# Patient Record
Sex: MALE
[De-identification: names, ages, dates, MRNs, and addresses within clinical notes are randomized per-mention and may not be internally consistent; named-entity substitution may affect disease eponyms.]

---

## 2021-08-19 PROBLEM — Z00.00 ENCOUNTER FOR PREVENTIVE HEALTH EXAMINATION: Status: ACTIVE | Noted: 2021-08-19

## 2021-10-26 ENCOUNTER — APPOINTMENT (OUTPATIENT)
Dept: NEUROLOGY | Facility: CLINIC | Age: 76
End: 2021-10-26

## 2021-12-13 ENCOUNTER — APPOINTMENT (OUTPATIENT)
Dept: NEUROLOGY | Facility: CLINIC | Age: 76
End: 2021-12-13
Payer: MEDICARE

## 2021-12-13 VITALS
WEIGHT: 215 LBS | SYSTOLIC BLOOD PRESSURE: 147 MMHG | BODY MASS INDEX: 28.49 KG/M2 | HEIGHT: 73 IN | TEMPERATURE: 98.3 F | HEART RATE: 68 BPM | DIASTOLIC BLOOD PRESSURE: 80 MMHG | OXYGEN SATURATION: 100 %

## 2021-12-13 DIAGNOSIS — G62.9 POLYNEUROPATHY, UNSPECIFIED: ICD-10-CM

## 2021-12-13 DIAGNOSIS — M79.674 PAIN IN RIGHT TOE(S): ICD-10-CM

## 2021-12-13 PROCEDURE — 99204 OFFICE O/P NEW MOD 45 MIN: CPT

## 2021-12-13 NOTE — CONSULT LETTER
[Dear  ___] : Dear  [unfilled], [Consult Letter:] : I had the pleasure of evaluating your patient, [unfilled]. [Please see my note below.] : Please see my note below. [Consult Closing:] : Thank you very much for allowing me to participate in the care of this patient.  If you have any questions, please do not hesitate to contact me. [Sincerely,] : Sincerely, [FreeTextEntry3] : Hasmukh Castro M.D.\par Neurology, Electromyography and Neuromuscular Medicine\par [facility]\par \par  of Neurology\par [facility] [employer]

## 2021-12-13 NOTE — PHYSICAL EXAM
[FreeTextEntry1] : Sensory: vibration absent at toes, moderately to severely reduced at ankles; pinprick diminished in feet b/l in patchy distribution, but completely absent in right 2nd and 3rd toes\par Reflexes: right patellar 1+, left patellar and b/l achilles absent \par Gait: normal \par MSK: mild tenderness on right plantar aspect of foot between 2nd and 4rd toes / metatarsals; no tenderness on dorsum

## 2021-12-13 NOTE — ASSESSMENT
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 \par He also very likely had a pre-existing polyneuropathy (as evidenced by breaking his right big toe without knowing) that may be exacerbating symptoms\par He will consider hydrodissection, which I think is reasonable. He is not currently inclined to undergo another exploratory surgery but would reconsider it if symptoms worsen

## 2021-12-26 NOTE — HISTORY OF PRESENT ILLNESS
Stage 3 chronic kidney disease [FreeTextEntry1] : Referred by Dr. 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\par Since then he has severe pain between his second and third toes, only at night / at rest\par He has no pain when standing or walking \par He has seen many different physicians over the years who suggested several possible etiologies of his pain - including charcot-carlitos tooth, lumbar radiculopathy / stenosis\par He has had multiple EMGs which were unremarkable \par Finally he saw an acupuncturist who suggested a toe separator which has helped \par \par Reviewed outside records\par \par Personally reviewed and interpreted:\par 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

## 2021-12-30 ENCOUNTER — APPOINTMENT (OUTPATIENT)
Dept: NEUROLOGY | Facility: CLINIC | Age: 76
End: 2021-12-30